# Patient Record
(demographics unavailable — no encounter records)

---

## 2024-10-22 NOTE — PHYSICAL EXAM
[de-identified] : GENERAL APPEARANCE: Well nourished and hydrated, pleasant, alert, and oriented x 3. Appears their stated age.  HEENT: Normocephalic, extraocular eye motion intact. Nasal septum midline. Oral cavity clear. External auditory canal clear.  RESPIRATORY: Breath sounds clear and audible in all lobes. No wheezing, No accessory muscle use. CARDIOVASCULAR: No apparent abnormalities. No lower leg edema. No varicosities. Pedal pulses are palpable. NEUROLOGIC: Sensation is normal, no muscle weakness in the upper or lower extremities. DERMATOLOGIC: No apparent skin lesions, moist, warm, no rash. SPINE: Cervical spine appears normal and moves freely; thoracic spine appears normal and moves freely; lumbosacral spine appears normal and moves freely, normal, nontender. MUSCULOSKELETAL: Hands, wrists, and elbows are normal and move freely, shoulders are normal and move freely.  PSYCHIATRIC: Oriented to person, place, and time, insight and judgement were intact and the affect was normal.  RIGHT SHOULDER EXAM shows [] tenderness to palpation. ROM: [] Scapular substitution with shoulder ROM. Flexion:                    Active [] degrees             Abduction:              Active [] degrees            External Rotation:  Active [] degrees             Internal Rotation:   Active [] degrees              RTC strength: Full Can:              []/5 Empty Can:         []/5 ER:   []/5 without rotator lag. IR:                        []/5   Patient able to belly press. Specialized Tests: NEER: [] SZYMANSKI: [] DROP ARM: [] SPEEDS: [] OBRIENS: [] APPREHENSION SIGN: [] CROSS-ARM ADDUCTION: [] Stability: No gross instability with load and shift testing. LEFT SHOULDER EXAM: demonstrates full, pain-free, stable ROM and strength.  [de-identified] : X rays were taken of the RIGHT shoulder. AP internal & external rotation, Scapular Y and Axillary views.     No fracture, dislocation Proximal humeral head migration [] Calcific tendonitis [] Acromion morphology []                                                                                                            Degenerative changes: []

## 2024-10-22 NOTE — HISTORY OF PRESENT ILLNESS
[de-identified] : River is a 61-year-old male that presents today for initial evaluation of right shoulder pain which began []

## 2024-10-22 NOTE — DISCUSSION/SUMMARY
[de-identified] : Assessment: 61-year-old male with right shoulder pain consistent with []  Treatment options reviewed.  Treatment: 1. []